# Patient Record
Sex: FEMALE | Race: WHITE | URBAN - METROPOLITAN AREA
[De-identification: names, ages, dates, MRNs, and addresses within clinical notes are randomized per-mention and may not be internally consistent; named-entity substitution may affect disease eponyms.]

---

## 2019-11-04 ENCOUNTER — OFFICE VISIT (OUTPATIENT)
Dept: FAMILY MEDICINE CLINIC | Age: 59
End: 2019-11-04

## 2019-11-04 ENCOUNTER — HOSPITAL ENCOUNTER (OUTPATIENT)
Dept: LAB | Age: 59
Discharge: HOME | End: 2019-11-04

## 2019-11-04 VITALS
BODY MASS INDEX: 23.57 KG/M2 | HEART RATE: 101 BPM | OXYGEN SATURATION: 97 % | TEMPERATURE: 98.2 F | WEIGHT: 133 LBS | SYSTOLIC BLOOD PRESSURE: 137 MMHG | DIASTOLIC BLOOD PRESSURE: 87 MMHG | HEIGHT: 63 IN | RESPIRATION RATE: 20 BRPM

## 2019-11-04 DIAGNOSIS — E04.9 GOITER: ICD-10-CM

## 2019-11-04 DIAGNOSIS — Z86.39 HISTORY OF THYROID DISORDER: ICD-10-CM

## 2019-11-04 DIAGNOSIS — Z12.11 SCREENING FOR COLON CANCER: ICD-10-CM

## 2019-11-04 DIAGNOSIS — Z00.00 ROUTINE GENERAL MEDICAL EXAMINATION AT A HEALTH CARE FACILITY: ICD-10-CM

## 2019-11-04 DIAGNOSIS — Z23 ENCOUNTER FOR IMMUNIZATION: ICD-10-CM

## 2019-11-04 DIAGNOSIS — Z00.00 ROUTINE GENERAL MEDICAL EXAMINATION AT A HEALTH CARE FACILITY: Primary | ICD-10-CM

## 2019-11-04 DIAGNOSIS — F17.200 SMOKER: ICD-10-CM

## 2019-11-04 DIAGNOSIS — Z78.9 DRINKS ALCOHOL: ICD-10-CM

## 2019-11-04 LAB
ALBUMIN SERPL-MCNC: 3.6 G/DL (ref 3.4–5)
ALBUMIN/GLOB SERPL: 0.9 {RATIO} (ref 0.8–1.7)
ALP SERPL-CCNC: 115 U/L (ref 45–117)
ALT SERPL-CCNC: 28 U/L (ref 13–56)
ANION GAP SERPL CALC-SCNC: 9 MMOL/L (ref 3–18)
AST SERPL-CCNC: 49 U/L (ref 10–38)
BASOPHILS # BLD: 0 K/UL (ref 0–0.1)
BASOPHILS NFR BLD: 1 % (ref 0–2)
BILIRUB SERPL-MCNC: 0.4 MG/DL (ref 0.2–1)
BUN SERPL-MCNC: 3 MG/DL (ref 7–18)
BUN/CREAT SERPL: 5 (ref 12–20)
CALCIUM SERPL-MCNC: 9.7 MG/DL (ref 8.5–10.1)
CHLORIDE SERPL-SCNC: 106 MMOL/L (ref 100–111)
CO2 SERPL-SCNC: 27 MMOL/L (ref 21–32)
CREAT SERPL-MCNC: 0.57 MG/DL (ref 0.6–1.3)
DIFFERENTIAL METHOD BLD: ABNORMAL
EOSINOPHIL # BLD: 0.1 K/UL (ref 0–0.4)
EOSINOPHIL NFR BLD: 2 % (ref 0–5)
ERYTHROCYTE [DISTWIDTH] IN BLOOD BY AUTOMATED COUNT: 13.9 % (ref 11.6–14.5)
EST. AVERAGE GLUCOSE BLD GHB EST-MCNC: 105 MG/DL
GLOBULIN SER CALC-MCNC: 4.1 G/DL (ref 2–4)
GLUCOSE SERPL-MCNC: 81 MG/DL (ref 74–99)
HBA1C MFR BLD: 5.3 % (ref 4.2–5.6)
HCT VFR BLD AUTO: 48.3 % (ref 35–45)
HGB BLD-MCNC: 16.4 G/DL (ref 12–16)
LYMPHOCYTES # BLD: 2.1 K/UL (ref 0.9–3.6)
LYMPHOCYTES NFR BLD: 40 % (ref 21–52)
MCH RBC QN AUTO: 34.5 PG (ref 24–34)
MCHC RBC AUTO-ENTMCNC: 34 G/DL (ref 31–37)
MCV RBC AUTO: 101.5 FL (ref 74–97)
MONOCYTES # BLD: 0.4 K/UL (ref 0.05–1.2)
MONOCYTES NFR BLD: 8 % (ref 3–10)
NEUTS SEG # BLD: 2.6 K/UL (ref 1.8–8)
NEUTS SEG NFR BLD: 49 % (ref 40–73)
PLATELET # BLD AUTO: 293 K/UL (ref 135–420)
PMV BLD AUTO: 10 FL (ref 9.2–11.8)
POTASSIUM SERPL-SCNC: 4.9 MMOL/L (ref 3.5–5.5)
PROT SERPL-MCNC: 7.7 G/DL (ref 6.4–8.2)
RBC # BLD AUTO: 4.76 M/UL (ref 4.2–5.3)
SODIUM SERPL-SCNC: 142 MMOL/L (ref 136–145)
TSH SERPL DL<=0.05 MIU/L-ACNC: 69.1 UIU/ML (ref 0.36–3.74)
WBC # BLD AUTO: 5.3 K/UL (ref 4.6–13.2)

## 2019-11-04 PROCEDURE — 80061 LIPID PANEL: CPT

## 2019-11-04 PROCEDURE — 80074 ACUTE HEPATITIS PANEL: CPT

## 2019-11-04 PROCEDURE — 80053 COMPREHEN METABOLIC PANEL: CPT

## 2019-11-04 PROCEDURE — 84439 ASSAY OF FREE THYROXINE: CPT

## 2019-11-04 PROCEDURE — 85025 COMPLETE CBC W/AUTO DIFF WBC: CPT

## 2019-11-04 PROCEDURE — 83036 HEMOGLOBIN GLYCOSYLATED A1C: CPT

## 2019-11-04 PROCEDURE — 84443 ASSAY THYROID STIM HORMONE: CPT

## 2019-11-04 NOTE — PROGRESS NOTES
Jean Vargas is a 60 y.o. female who presents for routine immunizations. She denies any symptoms , reactions or allergies that would exclude them from being immunized today. Risks and adverse reactions were discussed and the VIS was given to them. All questions were addressed. She was observed for Discharge instructions reviewed with patient    Medication list and understanding of medications reviewed with patient. OTC and herbal medications reviewed and added to med list if applicable  Barriers to adherence assessed. Guidance given regarding new medications this visit, including reason for taking this medicine, and common side effects. Fit kit given to patient instructions explained. Blood work drawn    AVS given to patient. Explained to patient. Patient expressed understanding. 10  min post injection. There were no reactions observed.     Cleopatra Villagomez LPN

## 2019-11-04 NOTE — PATIENT INSTRUCTIONS
You may qualify for a Free Mammogram and/or Pap Smear from [facility]. Please call [phone] to start the screening process so you can be scheduled for these important services. The process cannot begin until you make the call to begin screening. Vaccine Information Statement    Influenza (Flu) Vaccine (Inactivated or Recombinant): What You Need to Know    Many Vaccine Information Statements are available in [place]ian and other languages. See www.immunize.org/vis  Hojas de información sobre vacunas están disponibles en español y en muchos otros idiomas. Visite www.immunize.org/vis    1. Why get vaccinated? Influenza vaccine can prevent influenza (flu). Flu is a contagious disease that spreads around the [place] every year, usually between October and May. Anyone can get the flu, but it is more dangerous for some people. Infants and young children, people 72years of age and older, pregnant women, and people with certain health conditions or a weakened immune system are at greatest risk of flu complications. Pneumonia, bronchitis, sinus infections and ear infections are examples of flu-related complications. If you have a medical condition, such as heart disease, cancer or diabetes, flu can make it worse. Flu can cause fever and chills, sore throat, muscle aches, fatigue, cough, headache, and runny or stuffy nose. Some people may have vomiting and diarrhea, though this is more common in children than adults. Each year thousands of people in the [facility] die from flu, and many more are hospitalized. Flu vaccine prevents millions of illnesses and flu-related visits to the doctor each year. 2. Influenza vaccines     CDC recommends everyone 10months of age and older get vaccinated every flu season. Children 6 months through 6years of age may need 2 doses during a single flu season. Everyone else needs only 1 dose each flu season.     It takes about 2 weeks for protection to develop after vaccination. There are many flu viruses, and they are always changing. Each year a new flu vaccine is made to protect against three or four viruses that are likely to cause disease in the upcoming flu season. Even when the vaccine doesnt exactly match these viruses, it may still provide some protection. Influenza vaccine does not cause flu. Influenza vaccine may be given at the same time as other vaccines. 3. Talk with your health care provider    Tell your vaccine provider if the person getting the vaccine:   Has had an allergic reaction after a previous dose of influenza vaccine, or has any severe, life-threatening allergies.  Has ever had Guillain-Barré Syndrome (also called GBS). In some cases, your health care provider may decide to postpone influenza vaccination to a future visit. People with minor illnesses, such as a cold, may be vaccinated. People who are moderately or severely ill should usually wait until they recover before getting influenza vaccine. Your health care provider can give you more information. 4. Risks of a reaction     Soreness, redness, and swelling where shot is given, fever, muscle aches, and headache can happen after influenza vaccine.  There may be a very small increased risk of Guillain-Barré Syndrome (GBS) after inactivated influenza vaccine (the flu shot). [employer] children who get the flu shot along with pneumococcal vaccine (PCV13), and/or DTaP vaccine at the same time might be slightly more likely to have a seizure caused by fever. Tell your health care provider if a child who is getting flu vaccine has ever had a seizure. People sometimes faint after medical procedures, including vaccination. Tell your provider if you feel dizzy or have vision changes or ringing in the ears. As with any medicine, there is a very remote chance of a vaccine causing a severe allergic reaction, other serious injury, or death.     5. What if there is a serious problem? An allergic reaction could occur after the vaccinated person leaves the clinic. If you see signs of a severe allergic reaction (hives, swelling of the face and throat, difficulty breathing, a fast heartbeat, dizziness, or weakness), call 9-1-1 and get the person to the nearest hospital.    For other signs that concern you, call your health care provider. Adverse reactions should be reported to the Vaccine Adverse Event Reporting System (VAERS). Your health care provider will usually file this report, or you can do it yourself. Visit the VAERS website at www.vaers. [facility].gov or call 5-697.516.7807. VAERS is only for reporting reactions, and VAERS staff do not give medical advice. 6. The National Vaccine Injury Compensation Program    The [facility] Vaccine Injury Compensation Program (VICP) is a federal program that was created to compensate people who may have been injured by certain vaccines. Visit the VICP website at www.hrsa.gov/vaccinecompensation or call 9-259.141.1053 to learn about the program and about filing a claim. There is a time limit to file a claim for compensation. 7. How can I learn more?  Ask your health care provider.  Call your local or state health department.  Contact the Centers for Disease Control and Prevention (CDC):  - Call 9-102.460.9234 (1-800-CDC-INFO) or  - Visit CDCs influenza website at www.cdc.gov/flu    Vaccine Information Statement (Interim)  Inactivated Influenza Vaccine   8/15/2019  [address] [place] [address] 915PP-[id number]   Department of Health and Human Services  Centers for Disease Control and Prevention    Office Use Only

## 2019-11-04 NOTE — PROGRESS NOTES
IVONNE  Yazmin Doctor is a 60 y.o. female being seen today for   Chief Complaint   Patient presents with    New Patient    Complete Physical     physical and flu shot   IOV for this pt to care a Christianne Lopez.  she states that she has history of something with her thyroid years ago but she does not remember the details. Generally just here for a physical and feels pretty well. She does admit to drinking a 6 pack of beer a day. Also smokes 1ppd cigarettes. She has not seen a doctor in years. She denies palpitattions or weight changes. Her  has hep c. She would like to be screened. Last pap smear in the 1980s. She has never had a mammogram or colon cancer screening. Past Medical History:   Diagnosis Date    Thyroid disorder     Thyroid trouble 1999    Patient does not recall specifics of the warning  about  her thyroid          ROS  Patient states that she is feeling well. Denies complaints of chest pain, shortness of breath, swelling of legs, dizziness or weakness. she denies nausea, vomiting or diarrhea. No current outpatient medications on file. No current facility-administered medications for this visit. PE  Visit Vitals  /87   Pulse (!) 101   Temp 98.2 °F (36.8 °C) (Temporal)   Resp 20   Ht 5' 3\" (1.6 m)   Wt 133 lb (60.3 kg)   LMP 11/04/2014 (Approximate) Comment: 5 years since last cycle   SpO2 97%   BMI 23.56 kg/m²        Alert and oriented with normal mood and affect. she is well developed and well nourished . Lungs are clear without wheezing. Heart rate is regular without murmurs or gallops. There is no lower extremity edema. Thyroid is smooth and enlarged. No results found for this or any previous visit. Assessment and Plan:        ICD-10-CM ICD-9-CM    1.  Routine general medical examination at a health care facility S63.48 L84.7 METABOLIC PANEL, COMPREHENSIVE      LIPID PANEL      HEMOGLOBIN A1C WITH EAG      TSH 3RD GENERATION      CBC WITH AUTOMATED DIFF      HEPATITIS PANEL, ACUTE   2. History of thyroid disorder Z86.39 V12.29 T4, FREE   3. Smoker F17.200 305.1    4. Drinks alcohol Z72.89 V49.89    5. Screening for colon cancer Z12.11 V76.51 OCCULT BLOOD IMMUNOASSAY,DIAGNOSTIC   6.  Goiter E04.9 240.9      Labs today  May need thyroid ultrasound  Return for WWE/pap/mammogram  Flu shot today      Yasmin Houser MD

## 2019-11-04 NOTE — PROGRESS NOTES
Chief Complaint   Patient presents with    New Patient    Complete Physical     physical and flu shot     Chief Complaint   Patient presents with    New Patient    Complete Physical     physical and flu shot     1. When and where did you last receive medical care? No    2. When and where did you last have preventive care such as mammogram, pap smears or colon screening? Over 5 years for last pap- NO history of colon screening    3. What is your current living situation (for example, live alone, live in home with immediate family members)? Lives with     4. Do you have any problems with communication such trouble seeing, hearing, or understanding instructions? No    5. Do you have an advance directive? This is a document that you can give to family members with instructions for how you would want them to make health care decisions for you if you were unable to speak for yourself. (For example, unconscious, delerious)No    PMH/FH/Social Hx reviewed and updated as needed     Applicable screenings reviewed and updated as needed  Medication reconciliation performed. Patient does not know need medication refills. Health Maintenance reviewed.

## 2019-11-05 LAB
CHOLEST SERPL-MCNC: 229 MG/DL
HAV IGM SER QL: NEGATIVE
HBV CORE IGM SER QL: NEGATIVE
HBV SURFACE AG SER QL: 0.66 INDEX
HBV SURFACE AG SER QL: NEGATIVE
HCV AB SER IA-ACNC: 0.11 INDEX
HCV AB SERPL QL IA: NEGATIVE
HCV COMMENT,HCGAC: NORMAL
HDLC SERPL-MCNC: 124 MG/DL (ref 40–60)
HDLC SERPL: 1.8 {RATIO} (ref 0–5)
LDLC SERPL CALC-MCNC: 84.8 MG/DL (ref 0–100)
LIPID PROFILE,FLP: ABNORMAL
SP1: NORMAL
SP2: NORMAL
SP3: NORMAL
T4 FREE SERPL-MCNC: 0.6 NG/DL (ref 0.7–1.5)
TRIGL SERPL-MCNC: 101 MG/DL (ref ?–150)
VLDLC SERPL CALC-MCNC: 20.2 MG/DL

## 2019-11-07 ENCOUNTER — TELEPHONE (OUTPATIENT)
Dept: FAMILY MEDICINE CLINIC | Age: 59
End: 2019-11-07

## 2019-11-07 RX ORDER — LEVOTHYROXINE SODIUM 88 UG/1
88 TABLET ORAL
Qty: 30 TAB | Refills: 2 | Status: SHIPPED | OUTPATIENT
Start: 2019-11-07 | End: 2020-01-06 | Stop reason: DRUGHIGH

## 2019-11-07 NOTE — TELEPHONE ENCOUNTER
Patient advised thyroid hormone low need to restart her thyroid medication and need follow appointment and labs in 2 months patient use [address] [facility]

## 2019-11-07 NOTE — TELEPHONE ENCOUNTER
Can you call this nice pt and let her know that her thyroid is LOW. I would like to start her back on thyroid medicine (levothyroxine)      And we would need to recheck her labs and her thyroid exam in 2 monthsl     We do not have a pharmacy on file for her. Can you find out where we should send the levothyroxine?

## 2019-11-10 ENCOUNTER — HOSPITAL ENCOUNTER (OUTPATIENT)
Dept: LAB | Age: 59
Discharge: HOME | End: 2019-11-10

## 2019-11-10 DIAGNOSIS — Z12.11 SCREENING FOR COLON CANCER: ICD-10-CM

## 2019-11-10 PROCEDURE — 82274 ASSAY TEST FOR BLOOD FECAL: CPT

## 2019-11-19 LAB — HEMOCCULT STL QL IA: NEGATIVE

## 2020-01-06 ENCOUNTER — HOSPITAL ENCOUNTER (OUTPATIENT)
Dept: LAB | Age: 60
Discharge: HOME | End: 2020-01-06

## 2020-01-06 ENCOUNTER — OFFICE VISIT (OUTPATIENT)
Dept: FAMILY MEDICINE CLINIC | Age: 60
End: 2020-01-06

## 2020-01-06 VITALS
BODY MASS INDEX: 23.39 KG/M2 | SYSTOLIC BLOOD PRESSURE: 124 MMHG | HEART RATE: 105 BPM | TEMPERATURE: 98.4 F | WEIGHT: 132 LBS | DIASTOLIC BLOOD PRESSURE: 84 MMHG | HEIGHT: 63 IN | OXYGEN SATURATION: 98 % | RESPIRATION RATE: 20 BRPM

## 2020-01-06 DIAGNOSIS — E03.9 ACQUIRED HYPOTHYROIDISM: ICD-10-CM

## 2020-01-06 DIAGNOSIS — Z86.39 HISTORY OF THYROID DISORDER: ICD-10-CM

## 2020-01-06 DIAGNOSIS — Z86.39 HISTORY OF THYROID DISORDER: Primary | ICD-10-CM

## 2020-01-06 DIAGNOSIS — F17.200 SMOKER: ICD-10-CM

## 2020-01-06 LAB
T4 FREE SERPL-MCNC: 1.3 NG/DL (ref 0.7–1.5)
TSH SERPL DL<=0.05 MIU/L-ACNC: 7.64 UIU/ML (ref 0.36–3.74)

## 2020-01-06 PROCEDURE — 84439 ASSAY OF FREE THYROXINE: CPT

## 2020-01-06 PROCEDURE — 84443 ASSAY THYROID STIM HORMONE: CPT

## 2020-01-06 RX ORDER — LEVOTHYROXINE SODIUM 100 UG/1
100 TABLET ORAL
Qty: 30 TAB | Refills: 5 | Status: SHIPPED | OUTPATIENT
Start: 2020-01-06 | End: 2020-05-08 | Stop reason: SDUPTHER

## 2020-01-06 NOTE — PROGRESS NOTES
HPI  Mary Guzman is a 60 y.o. female being seen today for   Chief Complaint   Patient presents with    Follow Up Chronic Condition     Thyroid Monitor and medicate    . follow up for this pt with hypothyroid   she states that she is feeling fine on the thyroid medicine, basically feels the same. She is interested in quitting smoking. Not sure if she is quite ready. She has cut back some to less than a pack a day. Past Medical History:   Diagnosis Date    Thyroid disorder     Thyroid trouble 1999    Patient does not recall specifics of the warning  about  her thyroid          ROS  Patient states that she is feeling well. Denies complaints of chest pain, shortness of breath, swelling of legs, dizziness or weakness. she denies nausea, vomiting or diarrhea. Current Outpatient Medications   Medication Sig    levothyroxine (SYNTHROID) 88 mcg tablet Take 1 Tab by mouth Daily (before breakfast). No current facility-administered medications for this visit. PE  Visit Vitals  /84 (BP 1 Location: Left arm, BP Patient Position: Sitting)   Pulse (!) 105   Temp 98.4 °F (36.9 °C) (Temporal)   Resp 20   Ht 5' 3\" (1.6 m)   Wt 132 lb (59.9 kg)   LMP 11/04/2014 (Approximate) Comment: 5 years since last cycle   SpO2 98%   BMI 23.38 kg/m²        Alert and oriented with normal mood and affect. she is well developed and well nourished . Lungs are clear without wheezing. Heart rate is regular without murmurs or gallops. There is no lower extremity edema. Thyroid normal in size and consistency. Results for orders placed or performed during the hospital encounter of 11/10/19   OCCULT BLOOD IMMUNOASSAY,DIAGNOSTIC   Result Value Ref Range    Occult blood fecal, by IA NEGATIVE  NEGATIVE           Assessment and Plan:        ICD-10-CM ICD-9-CM    1. History of thyroid disorder Z86.39 V12.29 T4, FREE      TSH 3RD GENERATION   2. Acquired hypothyroidism  Goiter has resolved.  Check labs today E03.9 244.9    3.  Smoker F17.200 305.1      recheck thyroid labs and adjust meds based on result  EWL# for mammogram  Has scheduled WWE on the 28th and will revisit smoking cessation at that time  Given PAP application to order chantix for her from PAP rx program  (has medicaid denial letter)        July León MD

## 2020-01-06 NOTE — LETTER
1/6/2020 11:46 PM 
 
Ms. Nichole Reed [address] [facility] [phone] Dear Nichole Mineshamerico: 
 
Please find your most recent results below. Thyroid is much better but still slightly HIGH. I sent in a prescription for 100mcg levothyroxine. We should recheck thyroid labs in 2-3 months again Please call me if you have any questions: [phone] Sincerely, Dk Nolan MD

## 2020-01-06 NOTE — PATIENT INSTRUCTIONS
You may qualify for a Free Mammogram and/or Pap Smear from [facility]. Please call [phone] to start the screening process so you can be scheduled for these important services. The process cannot begin until you make the call to begin screening.

## 2020-01-06 NOTE — PROGRESS NOTES
Patient presents for lab draw ordered by:    Ordering Provider:  Adali Goldstein  Ordering Department/Practice:  [address]  Phone:  [phone]  Date Ordered:  01/06/2020    The following labs were drawn and sent to [facility] by Irineo Du:    TSH, 3rd Generation and T4, Free    The following tubes were sent:    Gold  ( 1)    Draw site right brachial.  Patient tolerated draw with no distress.

## 2020-01-22 NOTE — LETTER
1/22/2020 10:16 AM 
 
Ms. Emilee Zarate [address] [facility] [address] [phone] Thank you for participating in the [address] [place] [address] Patient Assistance Program. 
 
This is notification that your item(s) was delivered to us. Please  your item(s) between 11:00 am and 1:00 pm, at one of our [facility] sites as soon as possible. When you arrive, you will need to register inside as a \"nurse visit\" to  your medication. Notify the registrar that you are there to  medication and they will register you as soon as possible. There may be a short wait but we try to make the process as fast as possible. It is important that you follow up as directed to make sure your medication is at the correct dose. If you fail to follow-up for regular appointments, the [address] [place] [address] may discontinue providing your medication. To see when the [address] will be in your neighborhood, go to 
[email or web address] 
or call [phone], select your language (1 for english or 2 for Turkmen), and then option 2. Sincerely, Walt Hilario MD

## 2020-01-22 NOTE — LETTER
1/22/2020 10:23 AM 
 
Ms. Sloane Armijo [address] [facility] [place] [phone] Thank you for participating in the [address] [place] [address] Patient Assistance Program. 
 
This is notification that your item(s) was delivered to us. Please  your item(s) between 11:00 am and 1:00 pm, at one of our [facility] sites as soon as possible. When you arrive, you will need to register inside as a \"nurse visit\" to  your medication. Notify the registrar that you are there to  medication and they will register you as soon as possible. There may be a short wait but we try to make the process as fast as possible. It is important that you follow up as directed to make sure your medication is at the correct dose. If you fail to follow-up for regular appointments, the [address] [place] [address] may discontinue providing your medication. To see when the [address] will be in your neighborhood, go to 
[email or web address] 
or call [phone], select your language (1 for english or 2 for Amharic), and then option 2. Sincerely, Damián Guerrero MD

## 2020-01-22 NOTE — TELEPHONE ENCOUNTER
Received chantix 1mg from [facility] patient assistance program. Order routed to provider and letter mailed to patient for .

## 2020-01-22 NOTE — TELEPHONE ENCOUNTER
Received chantix starter pack from [facility] patient assistance program.  Order routed to provider and letter mailed to patient for .

## 2020-01-31 RX ORDER — VARENICLINE TARTRATE 1 MG/1
TABLET, FILM COATED ORAL
Qty: 112 TAB | Refills: 0 | Status: SHIPPED | COMMUNITY
Start: 2020-01-31 | End: 2020-04-21

## 2020-01-31 RX ORDER — VARENICLINE TARTRATE 25 MG
KIT ORAL
Qty: 53 DOSE PACK | Refills: 0 | Status: SHIPPED | COMMUNITY
Start: 2020-01-31 | End: 2020-02-28

## 2020-01-31 NOTE — TELEPHONE ENCOUNTER
Med list and chart notes reviewed. Pharmacy Assistance Medication approved for pickup.     chantix starter pack

## 2020-01-31 NOTE — TELEPHONE ENCOUNTER
Med list and chart notes reviewed. Pharmacy Assistance Medication approved for pickup.     chantix 1mg po bid

## 2020-04-06 ENCOUNTER — CLINICAL SUPPORT (OUTPATIENT)
Dept: FAMILY MEDICINE CLINIC | Age: 60
End: 2020-04-06

## 2020-04-06 DIAGNOSIS — F17.200 SMOKER: Primary | ICD-10-CM

## 2020-04-06 NOTE — PROGRESS NOTES
Per provider patient picked up Pharmacy Assistance Program medication. Medication: chantix starter quinten  :pfizer   Lot [id number]        Exp 1/2022      Medication:Chantix 1mg  : David Archerm   Lot [id number]        Exp 11/2021      Patient verified understanding of medication and directions. Discharge instructions reviewed with patient. Medication list and understanding of medications reviewed with patient. OTC and herbal medications reviewed and added to med list if applicable  Barriers to adherence assessed. Guidance given regarding new medications this visit, including reason for taking this medicine, and common side effects.

## 2020-05-08 RX ORDER — LEVOTHYROXINE SODIUM 100 UG/1
100 TABLET ORAL
Qty: 30 TAB | Refills: 2 | Status: SHIPPED | OUTPATIENT
Start: 2020-05-08 | End: 2020-09-15 | Stop reason: SDUPTHER

## 2020-09-15 RX ORDER — LEVOTHYROXINE SODIUM 100 UG/1
100 TABLET ORAL
Qty: 30 TAB | Refills: 2 | Status: SHIPPED | OUTPATIENT
Start: 2020-09-15 | End: 2020-11-20 | Stop reason: DRUGHIGH

## 2020-11-10 ENCOUNTER — HOSPITAL ENCOUNTER (OUTPATIENT)
Dept: LAB | Age: 60
Discharge: HOME | End: 2020-11-10

## 2020-11-10 ENCOUNTER — OFFICE VISIT (OUTPATIENT)
Dept: FAMILY MEDICINE CLINIC | Facility: CLINIC | Age: 60
End: 2020-11-10

## 2020-11-10 VITALS
SYSTOLIC BLOOD PRESSURE: 146 MMHG | RESPIRATION RATE: 16 BRPM | HEART RATE: 93 BPM | BODY MASS INDEX: 23.39 KG/M2 | HEIGHT: 63 IN | TEMPERATURE: 98.6 F | OXYGEN SATURATION: 93 % | WEIGHT: 132 LBS | DIASTOLIC BLOOD PRESSURE: 89 MMHG

## 2020-11-10 DIAGNOSIS — Z00.00 WELLNESS EXAMINATION: ICD-10-CM

## 2020-11-10 DIAGNOSIS — Z23 NEEDS FLU SHOT: Primary | ICD-10-CM

## 2020-11-10 LAB
ANION GAP SERPL CALC-SCNC: 4 MMOL/L (ref 3–18)
BUN SERPL-MCNC: 6 MG/DL (ref 7–18)
BUN/CREAT SERPL: 10 (ref 12–20)
CALCIUM SERPL-MCNC: 9.7 MG/DL (ref 8.5–10.1)
CHLORIDE SERPL-SCNC: 108 MMOL/L (ref 100–111)
CHOLEST SERPL-MCNC: 223 MG/DL
CO2 SERPL-SCNC: 31 MMOL/L (ref 21–32)
CREAT SERPL-MCNC: 0.61 MG/DL (ref 0.6–1.3)
ERYTHROCYTE [DISTWIDTH] IN BLOOD BY AUTOMATED COUNT: 14.9 % (ref 11.6–14.5)
EST. AVERAGE GLUCOSE BLD GHB EST-MCNC: 91 MG/DL
GLUCOSE SERPL-MCNC: 102 MG/DL (ref 74–99)
HBA1C MFR BLD: 4.8 % (ref 4.2–5.6)
HCT VFR BLD AUTO: 47.8 % (ref 35–45)
HDLC SERPL-MCNC: 122 MG/DL (ref 40–60)
HDLC SERPL: 1.8 {RATIO} (ref 0–5)
HGB BLD-MCNC: 16.3 G/DL (ref 12–16)
LDLC SERPL CALC-MCNC: 76.8 MG/DL (ref 0–100)
LIPID PROFILE,FLP: ABNORMAL
MCH RBC QN AUTO: 33.9 PG (ref 24–34)
MCHC RBC AUTO-ENTMCNC: 34.1 G/DL (ref 31–37)
MCV RBC AUTO: 99.4 FL (ref 74–97)
PLATELET # BLD AUTO: 327 K/UL (ref 135–420)
PMV BLD AUTO: 9.6 FL (ref 9.2–11.8)
POTASSIUM SERPL-SCNC: 4.8 MMOL/L (ref 3.5–5.5)
RBC # BLD AUTO: 4.81 M/UL (ref 4.2–5.3)
SODIUM SERPL-SCNC: 143 MMOL/L (ref 136–145)
T4 FREE SERPL-MCNC: 1.8 NG/DL (ref 0.7–1.5)
TRIGL SERPL-MCNC: 121 MG/DL (ref ?–150)
TSH SERPL DL<=0.05 MIU/L-ACNC: 0.16 UIU/ML (ref 0.36–3.74)
VLDLC SERPL CALC-MCNC: 24.2 MG/DL
WBC # BLD AUTO: 5.4 K/UL (ref 4.6–13.2)

## 2020-11-10 PROCEDURE — 90686 IIV4 VACC NO PRSV 0.5 ML IM: CPT | Performed by: FAMILY MEDICINE

## 2020-11-10 PROCEDURE — 90471 IMMUNIZATION ADMIN: CPT | Performed by: FAMILY MEDICINE

## 2020-11-10 PROCEDURE — 80048 BASIC METABOLIC PNL TOTAL CA: CPT

## 2020-11-10 PROCEDURE — 99214 OFFICE O/P EST MOD 30 MIN: CPT | Performed by: FAMILY MEDICINE

## 2020-11-10 PROCEDURE — 36415 COLL VENOUS BLD VENIPUNCTURE: CPT

## 2020-11-10 PROCEDURE — 84443 ASSAY THYROID STIM HORMONE: CPT

## 2020-11-10 PROCEDURE — 85027 COMPLETE CBC AUTOMATED: CPT

## 2020-11-10 PROCEDURE — 80061 LIPID PANEL: CPT

## 2020-11-10 PROCEDURE — 83036 HEMOGLOBIN GLYCOSYLATED A1C: CPT

## 2020-11-10 PROCEDURE — 84439 ASSAY OF FREE THYROXINE: CPT

## 2020-11-10 NOTE — PROGRESS NOTES
Patient who presents for routine immunizations. Patient denies any symptoms , reactions or allergies that would exclude them from being immunized today. Risks and adverse reactions were discussed and the VIS was given to them. All questions were addressed. Patient was observed for 10  min post injection. There were no reactions observed.

## 2020-11-10 NOTE — PROGRESS NOTES
HISTORY OF PRESENT ILLNESS  Stewart Santos is a 63 y.o. female here for several outstanding issues. HPI Ms. Vasquez overall feels well. She has been taking her Synthroid 100mcg without any issues and hasn't missed any doses. She is aware her BP is slightly elevated today but stated she smoked 3 cigarettes before visit. She has the Chantix from her last visit but is hesitant to start it. She will think about it again and call [facility]. She hasn't gotten her mammogram this year because she lost the phone number. She denies CP, SOB, abdominal pain. Current every day smoker ~1/2ppd  Due for a Flu shot    Review of Systems   Constitutional: Negative. HENT: Negative. Eyes: Negative. Respiratory: Negative. Cardiovascular: Negative. Gastrointestinal: Negative. Genitourinary: Negative. Musculoskeletal: Negative. Skin: Negative. Neurological: Negative. Endo/Heme/Allergies: Negative. Psychiatric/Behavioral: Negative. Physical Exam  Nursing note reviewed. Constitutional:       Appearance: Normal appearance. HENT:      Nose: Nose normal.      Mouth/Throat:      Mouth: Mucous membranes are moist.   Eyes:      Pupils: Pupils are equal, round, and reactive to light. Cardiovascular:      Rate and Rhythm: Regular rhythm. Tachycardia present. Pulses: Normal pulses. Heart sounds: Normal heart sounds. No murmur. Pulmonary:      Effort: Pulmonary effort is normal.      Breath sounds: Normal breath sounds. No wheezing. Abdominal:      General: Bowel sounds are normal.      Palpations: Abdomen is soft. Tenderness: There is no right CVA tenderness or left CVA tenderness. Musculoskeletal: Normal range of motion. Skin:     General: Skin is warm and dry. Capillary Refill: Capillary refill takes less than 2 seconds. Neurological:      Mental Status: She is alert and oriented to person, place, and time.    Psychiatric:         Mood and Affect: Mood normal. Behavior: Behavior normal.         ASSESSMENT and PLAN  1. Flu vaccine today  2. EWL for mammogram (reinforced)  3. Smoking cessation      -[facility] (reinforced)  4. HTN     -Ms. Vasquez will monitor her BP 3x/week, goal < 140/90  5. Labs today: CBC, BMP, TSH, T-4, A1C, Lipids  RTC in 3-6 months for chronic care, re-check BP, PAP/WWE    Ms. Vasquez agrees to plan

## 2020-11-10 NOTE — PATIENT INSTRUCTIONS
Vaccine Information Statement Influenza (Flu) Vaccine (Inactivated or Recombinant): What You Need to Know Many Vaccine Information Statements are available in Bulgarian and other languages. See www.immunize.org/vis Hojas de información sobre vacunas están disponibles en español y en muchos otros idiomas. Visite www.immunize.org/vis 1. Why get vaccinated? Influenza vaccine can prevent influenza (flu). Flu is a contagious disease that spreads around the [place] [facility] every year, usually between October and May. Anyone can get the flu, but it is more dangerous for some people. Infants and young children, people 72years of age and older, pregnant women, and people with certain health conditions or a weakened immune system are at greatest risk of flu complications. Pneumonia, bronchitis, sinus infections and ear infections are examples of flu-related complications. If you have a medical condition, such as heart disease, cancer or diabetes, flu can make it worse. Flu can cause fever and chills, sore throat, muscle aches, fatigue, cough, headache, and runny or stuffy nose. Some people may have vomiting and diarrhea, though this is more common in children than adults. Each year thousands of people in the [facility] die from flu, and many more are hospitalized. Flu vaccine prevents millions of illnesses and flu-related visits to the doctor each year. 2. Influenza vaccines CDC recommends everyone 10months of age and older get vaccinated every flu season. Children 6 months through 6years of age may need 2 doses during a single flu season. Everyone else needs only 1 dose each flu season. It takes about 2 weeks for protection to develop after vaccination. There are many flu viruses, and they are always changing. Each year a new flu vaccine is made to protect against three or four viruses that are likely to cause disease in the upcoming flu season.  Even when the vaccine doesnt exactly match these viruses, it may still provide some protection. Influenza vaccine does not cause flu. Influenza vaccine may be given at the same time as other vaccines. 3. Talk with your health care provider Tell your vaccine provider if the person getting the vaccine: 
 Has had an allergic reaction after a previous dose of influenza vaccine, or has any severe, life-threatening allergies.  Has ever had Guillain-Barré Syndrome (also called GBS). In some cases, your health care provider may decide to postpone influenza vaccination to a future visit. People with minor illnesses, such as a cold, may be vaccinated. People who are moderately or severely ill should usually wait until they recover before getting influenza vaccine. Your health care provider can give you more information. 4. Risks of a reaction  Soreness, redness, and swelling where shot is given, fever, muscle aches, and headache can happen after influenza vaccine.  There may be a very small increased risk of Guillain-Barré Syndrome (GBS) after inactivated influenza vaccine (the flu shot). [place] children who get the flu shot along with pneumococcal vaccine (PCV13), and/or DTaP vaccine at the same time might be slightly more likely to have a seizure caused by fever. Tell your health care provider if a child who is getting flu vaccine has ever had a seizure. People sometimes faint after medical procedures, including vaccination. Tell your provider if you feel dizzy or have vision changes or ringing in the ears. As with any medicine, there is a very remote chance of a vaccine causing a severe allergic reaction, other serious injury, or death. 5. What if there is a serious problem? An allergic reaction could occur after the vaccinated person leaves the clinic.  If you see signs of a severe allergic reaction (hives, swelling of the face and throat, difficulty breathing, a fast heartbeat, dizziness, or weakness), call 9-1-1 and get the person to the nearest hospital. 
 
For other signs that concern you, call your health care provider. Adverse reactions should be reported to the Vaccine Adverse Event Reporting System (VAERS). Your health care provider will usually file this report, or you can do it yourself. Visit the VAERS website at www.vaers. hhs.gov or call 6-883.857.1853. VAERS is only for reporting reactions, and VAERS staff do not give medical advice. 6. The National Vaccine Injury Compensation Program 
 
The [facility] Vaccine Injury Compensation Program (VICP) is a federal program that was created to compensate people who may have been injured by certain vaccines. Visit the VICP website at www.hrsa.gov/vaccinecompensation or call 7-679.522.5736 to learn about the program and about filing a claim. There is a time limit to file a claim for compensation. 7. How can I learn more?  Ask your health care provider.  Call your local or state health department.  Contact the Centers for Disease Control and Prevention (CDC): 
- Call 7-245.582.2596 (3-185-FWJ-INFO) or 
- Visit CDCs influenza website at www.cdc.gov/flu Vaccine Information Statement (Interim) Inactivated Influenza Vaccine 8/15/2019 
41 UDanita Elizalde Och [birth year]EY-[birth year] Department of [facility] and [employer] Centers for Disease Control and Prevention Office Use Only Learning About Diabetes Food Guidelines Your Care Instructions Meal planning is important to manage diabetes. It helps keep your blood sugar at a target level (which you set with your doctor). You don't have to eat special foods. You can eat what your family eats, including sweets once in a while. But you do have to pay attention to how often you eat and how much you eat of certain foods. You may want to work with a dietitian or a certified diabetes educator (CDE) to help you plan meals and snacks.  A dietitian or CDE can also help you lose weight if that is one of your goals. What should you know about eating carbs? Managing the amount of carbohydrate (carbs) you eat is an important part of healthy meals when you have diabetes. Carbohydrate is found in many foods. · Learn which foods have carbs. And learn the amounts of carbs in different foods. ? Bread, cereal, pasta, and rice have about 15 grams of carbs in a serving. A serving is 1 slice of bread (1 ounce), ½ cup of cooked cereal, or 1/3 cup of cooked pasta or rice. ? Fruits have 15 grams of carbs in a serving. A serving is 1 small fresh fruit, such as an apple or orange; ½ of a banana; ½ cup of cooked or canned fruit; ½ cup of fruit juice; 1 cup of melon or raspberries; or 2 tablespoons of dried fruit. ? Milk and no-sugar-added yogurt have 15 grams of carbs in a serving. A serving is 1 cup of milk or 2/3 cup of no-sugar-added yogurt. ? Starchy vegetables have 15 grams of carbs in a serving. A serving is ½ cup of mashed potatoes or sweet potato; 1 cup winter squash; ½ of a small baked potato; ½ cup of cooked beans; or ½ cup cooked corn or green peas. · Learn how much carbs to eat each day and at each meal. A dietitian or CDE can teach you how to keep track of the amount of carbs you eat. This is called carbohydrate counting. · If you are not sure how to count carbohydrate grams, use the Plate Method to plan meals. It is a good, quick way to make sure that you have a balanced meal. It also helps you spread carbs throughout the day. ? Divide your plate by types of foods. Put non-starchy vegetables on half the plate, meat or other protein food on one-quarter of the plate, and a grain or starchy vegetable in the final quarter of the plate.  To this you can add a small piece of fruit and 1 cup of milk or yogurt, depending on how many carbs you are supposed to eat at a meal. 
· Try to eat about the same amount of carbs at each meal. Do not \"save up\" your daily allowance of carbs to eat at one meal. 
· Proteins have very little or no carbs per serving. Examples of proteins are beef, chicken, turkey, fish, eggs, tofu, cheese, cottage cheese, and peanut butter. A serving size of meat is 3 ounces, which is about the size of a deck of cards. Examples of meat substitute serving sizes (equal to 1 ounce of meat) are 1/4 cup of cottage cheese, 1 egg, 1 tablespoon of peanut butter, and ½ cup of tofu. How can you eat out and still eat healthy? · Learn to estimate the serving sizes of foods that have carbohydrate. If you measure food at home, it will be easier to estimate the amount in a serving of restaurant food. · If the meal you order has too much carbohydrate (such as potatoes, corn, or baked beans), ask to have a low-carbohydrate food instead. Ask for a salad or green vegetables. · If you use insulin, check your blood sugar before and after eating out to help you plan how much to eat in the future. · If you eat more carbohydrate at a meal than you had planned, take a walk or do other exercise. This will help lower your blood sugar. What else should you know? · Limit saturated fat, such as the fat from meat and dairy products. This is a healthy choice because people who have diabetes are at higher risk of heart disease. So choose lean cuts of meat and nonfat or low-fat dairy products. Use olive or canola oil instead of butter or shortening when cooking. · Don't skip meals. Your blood sugar may drop too low if you skip meals and take insulin or certain medicines for diabetes. · Check with your doctor before you drink alcohol. Alcohol can cause your blood sugar to drop too low. Alcohol can also cause a bad reaction if you take certain diabetes medicines. Follow-up care is a key part of your treatment and safety.  Be sure to make and go to all appointments, and call your doctor if you are having problems. It's also a good idea to know your test results and keep a list of the medicines you take. Where can you learn more? Go to http://www.gray.com/ Enter S587 in the search box to learn more about \"Learning About Diabetes Food Guidelines. \" Current as of: December 20, 2019               Content Version: 12.6 © 8768-4985 [employer]. Care instructions adapted under license by [employer] (which disclaims liability or warranty for this information). If you have questions about a medical condition or this instruction, always ask your healthcare professional. [address] any warranty or liability for your use of this information. High Blood Pressure: Care Instructions Overview It's normal for blood pressure to go up and down throughout the day. But if it stays up, you have high blood pressure. Another name for high blood pressure is hypertension. Despite what a lot of people think, high blood pressure usually doesn't cause headaches or make you feel dizzy or lightheaded. It usually has no symptoms. But it does increase your risk of stroke, heart attack, and other problems. You and your doctor will talk about your risks of these problems based on your blood pressure. Your doctor will give you a goal for your blood pressure. Your goal will be based on your health and your age. Lifestyle changes, such as eating healthy and being active, are always important to help lower blood pressure. You might also take medicine to reach your blood pressure goal. 
Follow-up care is a key part of your treatment and safety. Be sure to make and go to all appointments, and call your doctor if you are having problems. It's also a good idea to know your test results and keep a list of the medicines you take. How can you care for yourself at home? Medical treatment · If you stop taking your medicine, your blood pressure will go back up. You may take one or more types of medicine to lower your blood pressure. Be safe with medicines. Take your medicine exactly as prescribed. Call your doctor if you think you are having a problem with your medicine. · Talk to your doctor before you start taking aspirin every day. Aspirin can help certain people lower their risk of a heart attack or stroke. But taking aspirin isn't right for everyone, because it can cause serious bleeding. · See your doctor regularly. You may need to see the doctor more often at first or until your blood pressure comes down. · If you are taking blood pressure medicine, talk to your doctor before you take decongestants or anti-inflammatory medicine, such as ibuprofen. Some of these medicines can raise blood pressure. · Learn how to check your blood pressure at home. Lifestyle changes · Stay at a healthy weight. This is especially important if you put on weight around the waist. Losing even 10 pounds can help you lower your blood pressure. · If your doctor recommends it, get more exercise. Walking is a good choice. Bit by bit, increase the amount you walk every day. Try for at least 30 minutes on most days of the week. You also may want to swim, bike, or do other activities. · Avoid or limit alcohol. Talk to your doctor about whether you can drink any alcohol. · Try to limit how much sodium you eat to less than 2,300 milligrams (mg) a day. Your doctor may ask you to try to eat less than 1,500 mg a day. · Eat plenty of fruits (such as bananas and oranges), vegetables, legumes, whole grains, and low-fat dairy products. · Lower the amount of saturated fat in your diet. Saturated fat is found in animal products such as milk, cheese, and meat. Limiting these foods may help you lose weight and also lower your risk for heart disease. · Do not smoke. Smoking increases your risk for heart attack and stroke.  If you need help quitting, talk to your doctor about stop-smoking programs and medicines. These can increase your chances of quitting for good. When should you call for help? Call  911 anytime you think you may need emergency care. This may mean having symptoms that suggest that your blood pressure is causing a serious heart or blood vessel problem. Your blood pressure may be over 180/120. For example, call 911 if: 
  · You have symptoms of a heart attack. These may include: 
? Chest pain or pressure, or a strange feeling in the chest. 
? Sweating. ? Shortness of breath. ? Nausea or vomiting. ? Pain, pressure, or a strange feeling in the back, neck, jaw, or upper belly or in one or both shoulders or arms. ? Lightheadedness or sudden weakness. ? A fast or irregular heartbeat.  
  · You have symptoms of a stroke. These may include: 
? Sudden numbness, tingling, weakness, or loss of movement in your face, arm, or leg, especially on only one side of your body. ? Sudden vision changes. ? Sudden trouble speaking. ? Sudden confusion or trouble understanding simple statements. ? Sudden problems with walking or balance. ? A sudden, severe headache that is different from past headaches.  
  · You have severe back or belly pain. Do not wait until your blood pressure comes down on its own. Get help right away. Call your doctor now or seek immediate care if: 
  · Your blood pressure is much higher than normal (such as 180/120 or higher), but you don't have symptoms.  
  · You think high blood pressure is causing symptoms, such as: 
? Severe headache. 
? Blurry vision. Watch closely for changes in your health, and be sure to contact your doctor if: 
  · Your blood pressure measures higher than your doctor recommends at least 2 times. That means the top number is higher or the bottom number is higher, or both.  
  · You think you may be having side effects from your blood pressure medicine. Where can you learn more? Go to http://www.Tyto.com/ Enter D136 in the search box to learn more about \"High Blood Pressure: Care Instructions. \" Current as of: December 16, 2019               Content Version: 12.6 © 5859-5723 [employer]. Care instructions adapted under license by [employer] (which disclaims liability or warranty for this information). If you have questions about a medical condition or this instruction, always ask your healthcare professional. [address] any warranty or liability for your use of this information. Learning About Benefits From Quitting Smoking How does quitting smoking make you healthier? If you're thinking about quitting smoking, you may have a few reasons to be smoke-free. Your health may be one of them. · When you quit smoking, you lower your risks for cancer, lung disease, heart attack, stroke, blood vessel disease, and blindness from macular degeneration. · When you're smoke-free, you get sick less often, and you heal faster. You are less likely to get colds, flu, bronchitis, and pneumonia. · As a nonsmoker, you may find that your mood is better and you are less stressed. When and how will you feel healthier? Quitting has real health benefits that start from day 1 of being smoke-free. And the longer you stay smoke-free, the healthier you get and the better you feel. The first hours · After just 20 minutes, your blood pressure and heart rate go down. That means there's less stress on your heart and blood vessels. · Within 12 hours, the level of carbon monoxide in your blood drops back to normal. That makes room for more oxygen. With more oxygen in your body, you may notice that you have more energy than when you smoked. After 2 weeks · Your lungs start to work better. · Your risk of heart attack starts to drop. After 1 month · When your lungs are clear, you cough less and breathe deeper, so it's easier to be active. · Your sense of taste and smell return. That means you can enjoy food more than you have since you started smoking. Over the years · Over the years, your risks of heart disease, heart attack, and stroke are lower. · After 10 years, your risk of dying from lung cancer is cut by about half. And your risk for many other types of cancer is lower too. How would quitting help others in your life? When you quit smoking, you improve the health of everyone who now breathes in your smoke. · Their heart, lung, and cancer risks drop, much like yours. · They are sick less. For babies and small children, living smoke-free means they're less likely to have ear infections, pneumonia, and bronchitis. · If you're a woman who is or will be pregnant someday, quitting smoking means a healthier . · Children who are close to you are less likely to become adult smokers. Where can you learn more? Go to http://sylvie-aaron.info/ Enter 052 [id number] 72 11 in the search box to learn more about \"Learning About Benefits From Quitting Smoking. \" Current as of: 2020               Content Version: 12.6 © 5962-1649 [employer]. Care instructions adapted under license by [employer] (which disclaims liability or warranty for this information). If you have questions about a medical condition or this instruction, always ask your healthcare professional. [address] any warranty or liability for your use of this information.

## 2020-11-10 NOTE — PROGRESS NOTES
Patient presents for lab draw ordered by:    Ordering Provider:  Kerry Lamas   Ordering Department/Practice:  [facility]  Phone:  [phone]  Date Ordered: 11/10/2020  The following labs were drawn and sent to by [address] [place] [address] N:    CBC, Lipid Profile, CMP, TSH, 3rd Generation and HgA1C FT4    The following tubes were sent:    Gold  ( 2)  LAV   (1)    Draw site Left brachial.  Patient tolerated draw with no distress. Patient advised every woman's life will contact her to schedule mammogram appointment    Discharge instructions reviewed with patient    Medication list and understanding of medications reviewed with patient. OTC and herbal medications reviewed and added to med list if applicable  Barriers to adherence assessed. Guidance given regarding new medications this visit, including reason for taking this medicine, and common side effects. AVS given to patient. Explained to patient. Patient expressed understanding.

## 2020-11-11 ENCOUNTER — TELEPHONE (OUTPATIENT)
Dept: FAMILY MEDICINE CLINIC | Facility: CLINIC | Age: 60
End: 2020-11-11

## 2020-11-12 NOTE — PROGRESS NOTES
I have reviewed the attatched progress note and I agree with the plan and medical decision making as outlined by Krupa Ferrell MD

## 2020-11-20 RX ORDER — LEVOTHYROXINE SODIUM 75 UG/1
75 TABLET ORAL
Qty: 30 TAB | Refills: 5 | Status: SHIPPED | OUTPATIENT
Start: 2020-11-20 | End: 2020-12-11 | Stop reason: SDUPTHER

## 2020-12-11 RX ORDER — LEVOTHYROXINE SODIUM 75 UG/1
75 TABLET ORAL
Qty: 30 TAB | Refills: 5 | Status: SHIPPED | OUTPATIENT
Start: 2020-12-11 | End: 2021-04-13 | Stop reason: SDUPTHER

## 2021-04-13 ENCOUNTER — OFFICE VISIT (OUTPATIENT)
Dept: FAMILY MEDICINE CLINIC | Facility: CLINIC | Age: 61
End: 2021-04-13

## 2021-04-13 ENCOUNTER — HOSPITAL ENCOUNTER (OUTPATIENT)
Dept: LAB | Age: 61
Discharge: HOME | End: 2021-04-13

## 2021-04-13 DIAGNOSIS — E03.9 ACQUIRED HYPOTHYROIDISM: Primary | ICD-10-CM

## 2021-04-13 LAB
T4 FREE SERPL-MCNC: 1.2 NG/DL (ref 0.7–1.5)
TSH SERPL DL<=0.05 MIU/L-ACNC: 6.86 UIU/ML (ref 0.36–3.74)

## 2021-04-13 PROCEDURE — 84439 ASSAY OF FREE THYROXINE: CPT

## 2021-04-13 PROCEDURE — 84443 ASSAY THYROID STIM HORMONE: CPT

## 2021-04-13 PROCEDURE — 99214 OFFICE O/P EST MOD 30 MIN: CPT | Performed by: NURSE PRACTITIONER

## 2021-04-13 RX ORDER — LEVOTHYROXINE SODIUM 75 UG/1
75 TABLET ORAL
Qty: 30 TAB | Refills: 5 | Status: SHIPPED | OUTPATIENT
Start: 2021-04-13 | End: 2021-11-16 | Stop reason: SDUPTHER

## 2021-04-13 NOTE — PROGRESS NOTES
HISTORY OF PRESENT ILLNESS  Lorna Ireland is a 63 y.o. female here for thyroid F/U. Patient identification verified with 2 identifiers. Patient offered video visit and declined. Consent: She and/or health care decision maker has provided verbal consent to proceed: Yes   Pursuant to the emergency declaration under the [facility], [facility] waiver authority and the [employer] Act, this Virtual Telephone Visit was conducted to reduce the patient's risk of exposure to COVID-19.       HPI Mrs. Vasquez reports feeling well. She needs a refill on Synthroid 75mcg as she just ran out. She denies CP, SOB, abdominal pain. She is ok with having her thyroid lab checked today. Due for a mammogram. [facility] info provided. She wasn't able to get one from last year but is aware she overdue. Review of Systems   Constitutional: Negative. HENT: Negative. Eyes: Negative. Respiratory: Negative. Cardiovascular: Negative. Gastrointestinal: Negative. Genitourinary: Negative. Musculoskeletal: Negative. Skin: Negative. Neurological: Negative. Endo/Heme/Allergies: Negative. Psychiatric/Behavioral: Negative. Physical Exam  Vitals signs reviewed. Neurological:      Mental Status: She is alert and oriented to person, place, and time. Psychiatric:         Mood and Affect: Mood normal.         Behavior: Behavior normal.         ASSESSMENT and PLAN  1. Hypothyroidism  Refill Synthroid, 75mcg, 1 tab PO every day  Labs today: TSH, T-4    2. EWL info provided for a screening/baseline mammogram (strongly reinforced)  RTC for a WWE/Pap    Mrs. Vasquez agrees to plan.

## 2021-04-13 NOTE — PROGRESS NOTES
Patient presents for lab draw ordered by:    Ordering Provider: Jona Schulz NP  Ordering Department/Practice:  [address]  Phone:  [phone]  Date Ordered:  04/13/21    The following labs were drawn and sent to [facility] by Je Crawford:    TSH, 3rd Generation and T4    The following tubes were sent:    Gold  ( 2)    Draw site left brachial.  Patient tolerated draw with no distress.

## 2021-08-20 ENCOUNTER — TELEPHONE (OUTPATIENT)
Dept: FAMILY MEDICINE CLINIC | Facility: CLINIC | Age: 61
End: 2021-08-20

## 2021-08-20 NOTE — TELEPHONE ENCOUNTER
Attempted to reach out to patient to schedule an wwe. Left patient a voicemail to give the office a call back.

## 2021-11-16 ENCOUNTER — CLINICAL SUPPORT (OUTPATIENT)
Dept: FAMILY MEDICINE CLINIC | Facility: CLINIC | Age: 61
End: 2021-11-16

## 2021-11-16 DIAGNOSIS — E03.9 ACQUIRED HYPOTHYROIDISM: Primary | ICD-10-CM

## 2021-11-16 RX ORDER — LEVOTHYROXINE SODIUM 75 UG/1
75 TABLET ORAL
Qty: 30 TABLET | Refills: 5 | Status: SHIPPED | OUTPATIENT
Start: 2021-11-16 | End: 2022-05-23

## 2021-11-16 NOTE — PROGRESS NOTES
Patient who presents for routine immunizations. Patient denies any symptoms , reactions or allergies that would exclude them from being immunized today. Risks and adverse reactions were discussed and the VIS was given to them. All questions were addressed. Patient was observed for 15 min post injection. There were no reactions observed.

## 2022-06-28 RX ORDER — LEVOTHYROXINE SODIUM 75 UG/1
TABLET ORAL
Qty: 30 TABLET | Refills: 0 | Status: SHIPPED | OUTPATIENT
Start: 2022-06-28 | End: 2022-08-01

## 2022-08-01 RX ORDER — LEVOTHYROXINE SODIUM 75 UG/1
TABLET ORAL
Qty: 30 TABLET | Refills: 0 | Status: SHIPPED | OUTPATIENT
Start: 2022-08-01

## 2022-08-12 NOTE — TELEPHONE ENCOUNTER
Attempted to reach out to patient to schedule an WWE. Patient voicemail is full. Will attempt patient at a later date.

## 2022-08-30 NOTE — TELEPHONE ENCOUNTER
Spoke with patient. Patient stated she has medicaid and a new PCP. Informed patient to update her pharmacy with her new PCP. Patient stated she has already taken care of it.